# Patient Record
Sex: FEMALE | Race: OTHER | NOT HISPANIC OR LATINO
[De-identification: names, ages, dates, MRNs, and addresses within clinical notes are randomized per-mention and may not be internally consistent; named-entity substitution may affect disease eponyms.]

---

## 2022-03-30 ENCOUNTER — TRANSCRIPTION ENCOUNTER (OUTPATIENT)
Age: 42
End: 2022-03-30

## 2022-11-18 PROBLEM — Z00.00 ENCOUNTER FOR PREVENTIVE HEALTH EXAMINATION: Status: ACTIVE | Noted: 2022-11-18

## 2022-11-30 ENCOUNTER — APPOINTMENT (OUTPATIENT)
Dept: BREAST CENTER | Facility: CLINIC | Age: 42
End: 2022-11-30

## 2022-11-30 ENCOUNTER — NON-APPOINTMENT (OUTPATIENT)
Age: 42
End: 2022-11-30

## 2022-11-30 VITALS
HEIGHT: 60 IN | DIASTOLIC BLOOD PRESSURE: 75 MMHG | HEART RATE: 88 BPM | SYSTOLIC BLOOD PRESSURE: 114 MMHG | WEIGHT: 110 LBS | BODY MASS INDEX: 21.6 KG/M2

## 2022-11-30 DIAGNOSIS — Z78.9 OTHER SPECIFIED HEALTH STATUS: ICD-10-CM

## 2022-11-30 DIAGNOSIS — Z80.8 FAMILY HISTORY OF MALIGNANT NEOPLASM OF OTHER ORGANS OR SYSTEMS: ICD-10-CM

## 2022-11-30 DIAGNOSIS — Z80.1 FAMILY HISTORY OF MALIGNANT NEOPLASM OF TRACHEA, BRONCHUS AND LUNG: ICD-10-CM

## 2022-11-30 DIAGNOSIS — Z87.891 PERSONAL HISTORY OF NICOTINE DEPENDENCE: ICD-10-CM

## 2022-11-30 DIAGNOSIS — N64.9 DISORDER OF BREAST, UNSPECIFIED: ICD-10-CM

## 2022-11-30 DIAGNOSIS — Z80.42 FAMILY HISTORY OF MALIGNANT NEOPLASM OF PROSTATE: ICD-10-CM

## 2022-11-30 PROCEDURE — 99203 OFFICE O/P NEW LOW 30 MIN: CPT

## 2022-11-30 RX ORDER — PNV NO.95/FERROUS FUM/FOLIC AC 28MG-0.8MG
TABLET ORAL
Refills: 0 | Status: ACTIVE | COMMUNITY

## 2022-11-30 NOTE — PHYSICAL EXAM
[Normocephalic] : normocephalic [Atraumatic] : atraumatic [Supple] : supple [No Supraclavicular Adenopathy] : no supraclavicular adenopathy [Examined in the supine and seated position] : examined in the supine and seated position [Symmetrical] : symmetrical [No dominant masses] : no dominant masses in right breast  [No dominant masses] : no dominant masses left breast [No Nipple Retraction] : no left nipple retraction [No Nipple Discharge] : no left nipple discharge [No Axillary Lymphadenopathy] : no left axillary lymphadenopathy [No Edema] : no edema [No Rashes] : no rashes [No Ulceration] : no ulceration [de-identified] : 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

## 2022-11-30 NOTE — ASSESSMENT
[FreeTextEntry1] : 41 yo female with left nipple bleb-resolved\par hesitant to do mammogram at this time since she is nursing reassured her that this would be ok but she would like to wait until after nursing\par discussed screening mammogram is the only way to screen for malignancy\par she states she does not want to have mammogram at this time\par \par We reviewed risk reduction strategies including maintaining a BMI <25, limiting red meat intake and alcoholic beverages to 3 per week and exercise (150 min/ week low intensity or 75 min/week high intensity). And maintaining a normal vitamin D level.\par \par reassured patient her PE findings are normal and recommend lanolin to the left nipple daily\par f/u 6 month to discuss imaging\par She knows to call or return sooner should any concerns or questions arise.\par

## 2022-11-30 NOTE — CONSULT LETTER
[Dear  ___] : Dear  [unfilled], [( Thank you for referring [unfilled] for consultation for _____ )] : Thank you for referring [unfilled] for consultation for [unfilled] [Please see my note below.] : Please see my note below. [Consult Closing:] : Thank you very much for allowing me to participate in the care of this patient.  If you have any questions, please do not hesitate to contact me. [Sincerely,] : Sincerely, [FreeTextEntry3] : Bisi Estrada MS DO\par Breast Surgeon\par [facility] \[place] [address]\par  [DrErasmo  ___] : Dr. PEARSON

## 2022-11-30 NOTE — REVIEW OF SYSTEMS
[Eyes Itch] : itching of the eyes [Vomiting] : vomiting [Constipation] : constipation [Diarrhea] : diarrhea [Change In A Mole] : change in a mole [Breast Pain] : breast pain [Negative] : Heme/Lymph [FreeTextEntry2] : Night sweats [FreeTextEntry9] : Back pain lower [de-identified] : Loss of hair

## 2022-11-30 NOTE — HISTORY OF PRESENT ILLNESS
[FreeTextEntry1] : This is a 43 yo female referred by Dr. Patel for left breast nipple discoloration. Patient had bilateral breast ultrasound on 10/14/22 which were negative for any suspicious sonographic findings. A small cyst was noted at the left 11:00 breast, deep in the breast tissue. 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 She has never had breast imaging. She is from [facility] originally. She is currently nursing her 8 month old son.\par  \par She does SBE.\par She has not noticed a change in her breast or a breast lump.\par She has not noticed a change in her nipple or nipple area on the right. Left with a whitish dot which has now gone away.\par She has not noticed a change in the skin of the breast.\par She is not experiencing unusual nipple discharge\par She is not experiencing breast pain.\par She has not noticed a lump or lymph node under the armpit.\par \par BREAST CANCER RISK FACTORS\par Menarche: 14\par Date of LMP: 08/09/22\par Menopause: Pre\par Grav: 2     Para: 2 (3 and 8 months)\par Age at first live birth: 39\par Nursed: Yes\par Hysterectomy: No\par Oophorectomy: No\par OCP: Yes for 1 year\par HRT: No\par Last pap/pelvic exam: 05/2022 - [facility]\par Related family history: No \par Ashkenazi: No\par Mastery risk assessment: BRCAPRO 10.4%, TCv7 15.6%, TCv8 15.6%, Mireya 12.3%, Fabricio unremarkable\par BRCA testing: No\par Bra size: 34 B\par  \par Last mammogram: Never                     Location:\par Report reviewed.                                 Images reviewed.\par Results:\par \par Last ultrasound: 10/14/22                    Location: [facility]\par Report reviewed.                                 Images reviewed.\par Results: BI-RADS 1- No suspicious sonographic finding. There is a small cyst at the left 11:00 breast, deep in the breast tissue. \par \par Last MRI: Never                                             Location:\par Report reviewed. \par Results:\par \par  \par \par

## 2023-05-30 ENCOUNTER — APPOINTMENT (OUTPATIENT)
Dept: BREAST CENTER | Facility: CLINIC | Age: 43
End: 2023-05-30
Payer: COMMERCIAL

## 2023-05-30 VITALS
HEART RATE: 77 BPM | WEIGHT: 110 LBS | BODY MASS INDEX: 21.6 KG/M2 | HEIGHT: 60 IN | DIASTOLIC BLOOD PRESSURE: 75 MMHG | SYSTOLIC BLOOD PRESSURE: 119 MMHG

## 2023-05-30 DIAGNOSIS — Z12.31 ENCOUNTER FOR SCREENING MAMMOGRAM FOR MALIGNANT NEOPLASM OF BREAST: ICD-10-CM

## 2023-05-30 DIAGNOSIS — N60.02 SOLITARY CYST OF LEFT BREAST: ICD-10-CM

## 2023-05-30 PROCEDURE — 99214 OFFICE O/P EST MOD 30 MIN: CPT

## 2023-05-30 NOTE — ASSESSMENT
[FreeTextEntry1] : 43 yo female with left nipple bleb-resolved\par reviewed imaging, she will schedule bilateral u/s\par plan mg/sono 5/2024\par \par We reviewed risk reduction strategies including maintaining a BMI <25, limiting red meat intake and alcoholic beverages to 3 per week and exercise (150 min/ week low intensity or 75 min/week high intensity). And maintaining a normal vitamin D level.\par \par f/u with me prn \par She knows to call or return sooner should any concerns or questions arise.\par

## 2023-05-30 NOTE — PHYSICAL EXAM
[Normocephalic] : normocephalic [Atraumatic] : atraumatic [Supple] : supple [No Supraclavicular Adenopathy] : no supraclavicular adenopathy [Examined in the supine and seated position] : examined in the supine and seated position [Symmetrical] : symmetrical [No dominant masses] : no dominant masses in right breast  [No dominant masses] : no dominant masses left breast [No Nipple Retraction] : no left nipple retraction [No Nipple Discharge] : no left nipple discharge [No Axillary Lymphadenopathy] : no left axillary lymphadenopathy [No Edema] : no edema [No Rashes] : no rashes [No Ulceration] : no ulceration [Breast Abnormal Secretion Opalescent Fluid Left] : milky discharge

## 2023-05-30 NOTE — REVIEW OF SYSTEMS
[Eyes Itch] : itching of the eyes [Vomiting] : vomiting [Constipation] : constipation [Diarrhea] : diarrhea [Change In A Mole] : change in a mole [Breast Pain] : breast pain [Negative] : Heme/Lymph [FreeTextEntry2] : Night sweats [FreeTextEntry9] : Back pain lower [de-identified] : Loss of hair

## 2023-05-30 NOTE — HISTORY OF PRESENT ILLNESS
[FreeTextEntry1] : This is a 41 yo female referred by Dr. Patel for left breast nipple discoloration. Patient had bilateral breast ultrasound on 10/14/22 which were negative for any suspicious sonographic findings. A small cyst was noted at the left 11:00 breast, deep in the breast tissue. 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 She has never had breast imaging. She is from [facility] originally. She is stopped nursing about 3 months ago.\par She has no breast complaints today.\par \par She does SBE.\par She has not noticed a change in her breast or a breast lump.\par She has not noticed a change in her nipple or nipple area.\par She has not noticed a change in the skin of the breast.\par She is not experiencing unusual nipple discharge\par She is not experiencing breast pain.\par She has not noticed a lump or lymph node under the armpit.\par \par BREAST CANCER RISK FACTORS\par Menarche: 14\par Date of LMP: 5/2023 \par Menopause: Pre\par Grav: 2     Para: 2 (4 and 14 months)\par Age at first live birth: 39\par Nursed: Yes stopped 3 months ago\par Hysterectomy: No\par Oophorectomy: No\par OCP: Yes for 1 year\par HRT: No\par Last pap/pelvic exam: 05/2022 - WNL has an upcoming appointment in 11/2023\par Related family history: No \par Ashkenazi: No\par Mastery risk assessment: BRCAPRO 10.4%, TCv7 15.6%, TCv8 15.6%, Mireya 12.3%, Fabricio unremarkable\par BRCA testing: No\par Bra size: 34 B\par  \par Last mammogram:                      Location: [facility]\par Report reviewed.                                 Images reviewed.\par Results:\par \par Last ultrasound: 10/14/22                    Location: [facility]\par Report reviewed.                                 Images reviewed.\par Results: BI-RADS 1- No suspicious sonographic finding. There is a small cyst at the left 11:00 breast, deep in the breast tissue. \par \par Last MRI: Never                                             Location:\par Report reviewed. \par Results:\par \par  \par \par

## 2023-05-30 NOTE — CONSULT LETTER
[Dear  ___] : Dear  [unfilled], [Please see my note below.] : Please see my note below. [Consult Closing:] : Thank you very much for allowing me to participate in the care of this patient.  If you have any questions, please do not hesitate to contact me. [Sincerely,] : Sincerely, [DrErasmo  ___] : Dr. PEARSON [Courtesy Letter:] : I had the pleasure of seeing your patient, [unfilled], in my office today. [FreeTextEntry1] : She will see me as needed [FreeTextEntry3] : Bisi Estrada MS DO\par Breast Surgeon\par [facility] \[place] [address]\par